# Patient Record
(demographics unavailable — no encounter records)

---

## 2024-10-28 NOTE — DISCUSSION/SUMMARY
[de-identified] : Today I had a lengthy discussion with the patient regarding their right ankle, s/p trimalleolar ORIF. I have addressed all the patient's concerns surrounding the pathology of their condition. I have reviewed the patient's XR imaging with them in great detail. Hardware in good position.  Ankle in anatomical alignment.  Fractures are healed. I have reviewed the patient's MRI results with them in great detail. Status post bimalleolar fracture fixation. 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 I did advise the patient that she may need hardware removal in the future. For now, I do want her to continue with conservative treatments.     Plan:  1. I recommend that the patient utilize ice, NSAIDS/Tylenol PRN, and heat. They can also elevate their RLE above the level of the heart. 2. Continue to follow up with her wound care team.   f/u in 3 months.    The patient understood and verbally agreed to the treatment plan. All of their questions were answered, and they were satisfied with the visit. The patient should call the office if they have any questions or experience worsening symptoms.

## 2024-10-28 NOTE — HISTORY OF PRESENT ILLNESS
[FreeTextEntry1] : 10/28/2024: ERIBERTO JENSEN is a 65 year old female presenting to the office for a follow up evaluation status post right ankle trimalleolar ORIF, DOS: 06/25/2024. Her symptoms have improved since last visit. She is able to walk in sneakers without difficulty. She received an MRI, which was ordered by her wound care team. She has also been using alginate from wound care. She has been avoiding ankle submersion. The patient presents to the office in sneakers and ambulating without assistance.  09/26/2024: The patient is a 63 yo female who is status post right ankle ORIF, trimalleolar with wound complications.  Patient is currently in wound care being treated by Dr. Chan.  The wound is looking much better and she presents with a Band-Aid over the wound.  She does state that the wound does drain some yellow liquid every now and then but the wound has been improved since being seen by wound care.  The patient continues with physical therapy working on strength.  She does continue to have intermittent pains of the ankle but overall she is improving.  She is walking without assistance with a slight limp.  No other complaints.

## 2024-10-28 NOTE — REASON FOR VISIT
[Follow-Up Visit] : a follow-up visit for [FreeTextEntry2] : Status post right ankle ORIF, trimalleolar fracture 6/25/2024

## 2024-10-28 NOTE — ADDENDUM
[FreeTextEntry1] : I, Dennis Maldonado, acted solely as a scribe for Dr. Raudel Clancy on this date 10/28/2024.   All medical record entries made by the Scribe were at my, Dr. Raudel Clancy, direction and personally dictated by me on 10/28/2024. I have reviewed the chart and agree that the record accurately reflects my personal performance of the history, physical exam, assessment and plan. I have also personally directed, reviewed, and agreed with the chart.

## 2024-10-28 NOTE — PHYSICAL EXAM
[de-identified] : Right ankle Physical Examination:  General: Alert and oriented x3.  In no acute distress.  Pleasant in nature with a normal affect.  No apparent respiratory distress.  Erythema, Warmth, Rubor: Negative Swelling: Mild swelling present  On the lateral incision, the small wound with scab formation is healing well.  There is no drainage coming from the wound today.  No signs of infection. Granulating in nicely.   ROM: 1. Dorsiflexion: 0 degrees 2. Plantarflexion: 40 degrees 3. Inversion: 30 degrees 4. Eversion: 20 degrees 5. Subtalar: 10 degrees  Tenderness to Palpation:  1. Lateral Malleolus: Negative 2. Medial Malleolus: Negative 3. Proximal Fibular Pain: Negative 4. Heel Pain: Negative 5. Cuboid: Negative 6. Navicular: Negative 7. Tibiotalar Joint: Negative 8. Subtalar Joint: Negative 9. Posterior Recess: Negative  Tendon Pain: 1. Achilles: Negative 2. Peroneals: Negative 3. Posterior Tibialis: Negative 4. Tibialis Anterior: Negative  Ligament Pain: 1. ATFL: Negative 2. CFL: Negative  3. PTFL: Negative 4. Deltoid Ligaments: Negative 5. Lis Franc Ligament: Negative  Stability:  1. Anterior Drawer: Negative 2. Posterior Drawer: Negative  Strength: 5/5 TA/GS/EHL  Pulses: 2+ DP/PT Pulses  Neuro: Intact motor and sensory  Additional Test: 1. Calcaneal Squeeze Test: Negative 2. Syndesmosis Squeeze Test: Negative [de-identified] : 3 views x-rays right ankle reviewed, 10/28/2024: Hardware in good position.  Ankle in anatomical alignment.  Fractures are healed.    Office Location: [address] [place] [address], [place] [address] Office Phone: [phone] Office Fax: [phone] Patient Name: Barbara Swain Patient Phone Number: [phone] Patient ID: [id number] : 10/11/1959 Date of Exam: 10/25/2024 R. Phys. Name: Gabriel Chan R. Phys. Address: [address] [place] [address] R. Phys. Phone: [phone] MRI-RIGHT ANKLE NON CONTRAST  Clinical indication: M25.571 Right ankle pain  Additional patient history: Right ankle pain, hx of surgery in  for fx.  Technique: MRI of the right ankle was performed utilizing standard multiplanar, multisequence image acquisition.  Comparison study: None available.  Findings:  The patient is status post bimalleolar fracture fixation.  There is no acute fracture or dislocation. There is no destructive appearing osseous lesion.  There is an approximate 0.8 x 0.8 cm focus of cartilage wear along the medial talar dome. There is less pronounced cartilage wear at the second and third tarsometatarsal joints.  A small tibiotalar joint effusion is present.  The sinus tarsi fat is preserved.  There is mild plantar fasciitis involving the central and lateral cords.  There is tendinopathy of the extensor digitorum longus tendon. The remaining long flexor and extensor tendons are normal and the tarsal tunnel contents are intact. The visualized peroneal tendons are normal. The Achilles and plantaris tendons are normal.  The distal syndesmotic ligaments are not well demonstrated. The remaining ankle ligaments appear to be intact.  IMPRESSION:   * Status post bimalleolar fracture fixation. 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 * Mild plantar fasciitis. 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 * Poor visualization of the distal syndesmotic ligaments.  Signed by: Stew Rodriguez MD Signed Date: 10/26/2024 10:40 PM EDT    SIGNED BY: Stew Rodriguez M.D., Ext. [id number] 10/26/2024 10:40 PM

## 2024-12-12 NOTE — PHYSICAL EXAM
[de-identified] : Right ankle Physical Examination:  General: Alert and oriented x3.  In no acute distress.  Pleasant in nature with a normal affect.  No apparent respiratory distress.  Erythema, Warmth, Rubor: Negative Swelling: Mild swelling present  On the lateral incision, the small wound with scab formation is healing well.  There is no drainage coming from the wound today.  No signs of infection. Granulating in nicely.   ROM: 1. Dorsiflexion: 0 degrees 2. Plantarflexion: 40 degrees 3. Inversion: 30 degrees 4. Eversion: 20 degrees 5. Subtalar: 10 degrees  Tenderness to Palpation:  1. Lateral Malleolus: Negative 2. Medial Malleolus: Negative 3. Proximal Fibular Pain: Negative 4. Heel Pain: Negative 5. Cuboid: Negative 6. Navicular: Negative 7. Tibiotalar Joint: Negative 8. Subtalar Joint: Negative 9. Posterior Recess: Negative  Tendon Pain: 1. Achilles: Negative 2. Peroneals: Negative 3. Posterior Tibialis: Negative 4. Tibialis Anterior: Negative  Ligament Pain: 1. ATFL: Negative 2. CFL: Negative  3. PTFL: Negative 4. Deltoid Ligaments: Negative 5. Lis Franc Ligament: Negative  Stability:  1. Anterior Drawer: Negative 2. Posterior Drawer: Negative  Strength: 5/5 TA/GS/EHL  Pulses: 2+ DP/PT Pulses  Neuro: Intact motor and sensory  Additional Test: 1. Calcaneal Squeeze Test: Negative 2. Syndesmosis Squeeze Test: Negative [de-identified] : 3 views x-rays right ankle reviewed, 2024: Hardware in good position.  Ankle in anatomical alignment.    Office Location: [address] [place] [address], [place], [facility] Office Phone: [phone] Office Fax: [phone] Patient Name: Barbara Swain Patient Phone Number: [phone] Patient ID: [id number] : 10/11/1959 Date of Exam: 10/25/2024 R. Phys. Name: Gabriel Chan R. Phys. Address: [address] [place] [address] R. Phys. Phone: [phone] MRI-RIGHT ANKLE NON CONTRAST  Clinical indication: M25.571 Right ankle pain  Additional patient history: Right ankle pain, hx of surgery in  for fx.  Technique: MRI of the right ankle was performed utilizing standard multiplanar, multisequence image acquisition.  Comparison study: None available.  Findings:  The patient is status post bimalleolar fracture fixation.  There is no acute fracture or dislocation. There is no destructive appearing osseous lesion.  There is an approximate 0.8 x 0.8 cm focus of cartilage wear along the medial talar dome. There is less pronounced cartilage wear at the second and third tarsometatarsal joints.  A small tibiotalar joint effusion is present.  The sinus tarsi fat is preserved.  There is mild plantar fasciitis involving the central and lateral cords.  There is tendinopathy of the extensor digitorum longus tendon. The remaining long flexor and extensor tendons are normal and the tarsal tunnel contents are intact. The visualized peroneal tendons are normal. The Achilles and plantaris tendons are normal.  The distal syndesmotic ligaments are not well demonstrated. The remaining ankle ligaments appear to be intact.  IMPRESSION:   * Status post bimalleolar fracture fixation. 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 * Mild plantar fasciitis. 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 * Poor visualization of the distal syndesmotic ligaments.  Signed by: Stew Rodriguez MD Signed Date: 10/26/2024 10:40 PM EDT    SIGNED BY: Stew Rodriguez M.D., Ext. [id number] 10/26/2024 10:40 PM

## 2024-12-12 NOTE — HISTORY OF PRESENT ILLNESS
[FreeTextEntry1] :  12/12/2024: ERIBERTO JENSEN is a 65 year old female presenting to the office for a follow up evaluation of  status post right ankle trimalleolar ORIF, DOS: 06/25/2024. She notes that her symptoms have not improved since her last visit. She feels a burning sensation on the inside of her ankle after she walks for prolonged periods of time.  The patient presents to the office in sneakers and ambulating without assistance.  10/28/2024: ERIBERTO JENSEN is a 65 year old female presenting to the office for a follow up evaluation status post right ankle trimalleolar ORIF, DOS: 06/25/2024. Her symptoms have improved since last visit. She is able to walk in sneakers without difficulty. She received an MRI, which was ordered by her wound care team. She has also been using alginate from wound care. She has been avoiding ankle submersion. The patient presents to the office in sneakers and ambulating without assistance.  09/26/2024: The patient is a 65 yo female who is status post right ankle ORIF, trimalleolar with wound complications.  Patient is currently in wound care being treated by Dr. Chan.  The wound is looking much better and she presents with a Band-Aid over the wound.  She does state that the wound does drain some yellow liquid every now and then but the wound has been improved since being seen by wound care.  The patient continues with physical therapy working on strength.  She does continue to have intermittent pains of the ankle but overall she is improving.  She is walking without assistance with a slight limp.  No other complaints.

## 2024-12-12 NOTE — ADDENDUM
[FreeTextEntry1] : I, Raudel Noonan, acted solely as a scribe for Dr. Raudel Clancy on this date 12/12/2024  .   All medical record entries made by the Scribe were at my, Dr. Raudel Clancy, direction and personally dictated by me on 12/12/2024 . I have reviewed the chart and agree that the record accurately reflects my personal performance of the history, physical exam, assessment and plan. I have also personally directed, reviewed, and agreed with the chart.

## 2024-12-12 NOTE — DISCUSSION/SUMMARY
[de-identified] : Today I had a lengthy discussion with the patient regarding their right ankle, s/p trimalleolar ORIF. I have addressed all the patient's concerns surrounding the pathology of their condition. I have reviewed the patient's XR imaging with them in great detail. Hardware in good position.  Ankle in anatomical alignment. I informed the patient that she may need a hardware removal surgery to alleviate her symptoms.     Plan:  1. I recommend that the patient utilize ice, NSAIDS/Tylenol PRN, and heat. They can also elevate their RLE above the level of the heart. 2. A lengthy discussion was had about the hardware removal surgery. All risks, benefits and alternatives to the recommended surgical procedure were discussed which include but are not limited to bleeding, infection, nerve damage, vascular damage, failure of the wound to heal, the need for further surgery, loss of limb, DVT, PE, loss of life as well as the risks associated with general anesthesia. The patient verbalized understanding and provided informed consent to move forward with surgery.  Patient would like to have surgery in mid-January.    The patient understood and verbally agreed to the treatment plan. All of their questions were answered, and they were satisfied with the visit. The patient should call the office if they have any questions or experience worsening symptoms.

## 2025-01-30 NOTE — HISTORY OF PRESENT ILLNESS
[___ Weeks Post Op] : [unfilled] weeks post op [de-identified] : Status post right ankle hardware removal 1/16/2025 [de-identified] :  01/30/2025: ERIBERTO JENSEN is a 65 year old female presenting to the office for a follow up evaluation of her Status post right ankle hardware removal 1/16/2025. She is on DVT prophylaxis. The patient presents to the office NWB wearing a CAM boot. Pain is controlled. [de-identified] : General: Alert and oriented x3. In no acute distress. Pleasant in nature with a normal affect. No apparent respiratory distress.  R Ankle Exam  Skin: Clean, dry, intact Inspection: No obvious malalignment, no swelling, no effusion; no lymphadenopathy Pulses: 2+ DP/PT pulses ROM: X Ankle 10 degrees of dorsiflexion, 40 degrees of plantarflexion, 10 degrees of subtalar motion, 20 degrees of inversion, 20 degrees of eversion Tenderness: No tenderness over the lateral malleolus, no CFL/ATFL/PTFL pain. No medial malleolus pain, no deltoid ligament pain. No proximal fibular pain. No heel pain. Stability: Negative anterior/posterior drawer. Strength: 5/5 TA/GS/EHL Neuro: In tact to light touch throughout Additional tests: Negative Mortons test, Negative syndesmosis squeeze test. Negative Cedeno's. Negative Tinel's Sign [de-identified] : No new images.  [de-identified] :  Today I had a lengthy discussion with the patient regarding their status post right ankle hardware removal 1/16/2025 pain.I have addressed all the patient's concerns surrounding the pathology of their condition.  I recommend that the patient utilize ice, NSAIDS PRN, and heat. They can also elevate their RLE above the level of the heart. Continue wearing the cam boot.  Wound care was explained the patient. She is not to get the wound wet for the next 10 days.  The patient understood and verbally agreed to the treatment plan. All of their questions were answered and they were satisfied with the visit. The patient should call the office if they have any questions or experience worsening symptoms.  FU in 2 weeks

## 2025-01-30 NOTE — ADDENDUM
[FreeTextEntry1] : I, Raudel Noonan, acted solely as a scribe for Dr. Raudel Clancy on this date 01/30/2025  .   All medical record entries made by the Scribe were at my, Dr. Raudel Clancy, direction and personally dictated by me on 01/30/2025 . I have reviewed the chart and agree that the record accurately reflects my personal performance of the history, physical exam, assessment and plan. I have also personally directed, reviewed, and agreed with the chart.

## 2025-03-21 NOTE — HISTORY OF PRESENT ILLNESS
[___ Months Post Op] : [unfilled] months post op [3] : the patient reports pain that is 3/10 in severity [Clean/Dry/Intact] : clean, dry and intact [Healed] : healed [Swelling] : swollen [Neuro Intact] : an unremarkable neurological exam [Vascular Intact] : ~T peripheral vascular exam normal [Negative Billy's] : maneuvers demonstrated a negative Billy's sign [Slow Progress] : is progressing slowly [Fair Pain Control] : has fair pain control [No Sign of Infection] : is showing no signs of infection [Sutures Removed] : sutures were removed [Chills] : no chills [Fever] : no fever [Nausea] : no nausea [Vomiting] : no vomiting [Erythema] : not erythematous [Discharge] : absent of discharge [Dehiscence] : not dehisced [de-identified] : Status post right ankle hardware removal 1/16/2025 [de-identified] : ERIBERTO JENSEN is a 65 year old female presenting to the office with her  for a follow up evaluation of her Status post right ankle hardware removal 1/16/2025. She now endorses a shooting pain in the medial aspect of her ankle that radiates up her calf. Pain occurs even at rest. She also notes tenderness in the lateral aspect of her ankle. At night, she notes swelling in the medial aspect of her ankle. She constantly limps with her foot tilted outwards. The patient presents to the office in sneakers and ambulating without assistance. [de-identified] : General: Alert and oriented x3. In no acute distress. Pleasant in nature with a normal affect. No apparent respiratory distress. The wound is intact. no evidence of any diastases or dehiscence. No surrounding erythema noted. No fluctuance. The area is warm and well perfused. The patient is able to wiggle their toes. Neurovascularly intact. Swelling noted. [de-identified] : 3V of the right ankle were ordered, obtained and reviewed by me today, 03/21/2025, and revealed: hardware removed. Fracture has healed well. [de-identified] : Counseled the patient that given the intra-articular nature of her fracture, she is pre-disposed to developing post-traumatic arthritis. I suspect that her pain may be due to arthritis. [de-identified] : Plan: 1. RICE therapy as needed for swelling control. 2. NSAIDs/Tylenol as needed for pain. 3. Activity modification was discussed in great detail. Weight bear as tolerated. 4. I recommend the patient undergo a course of physical therapy for the right ankle 2-3 times a week for a total of 8-12 weeks. A prescription was given for the physical therapy today. 5. Paperwork for 3 months disability provided in the office today.  f/u in 6 weeks.  The patient understood and verbally agreed to the treatment plan. All of their questions were answered, and they were satisfied with the visit. The patient should call the office if they have any questions or experience worsening symptoms.

## 2025-03-21 NOTE — ADDENDUM
[FreeTextEntry1] : I, Dennis Maldonado, acted solely as a scribe for Dr. Raudel Clancy on this date 03/21/2025.   All medical record entries made by the Scribe were at my, Dr. Raudel Clancy, direction and personally dictated by me on 03/21/2025. I have reviewed the chart and agree that the record accurately reflects my personal performance of the history, physical exam, assessment and plan. I have also personally directed, reviewed, and agreed with the chart.

## 2025-03-21 NOTE — HISTORY OF PRESENT ILLNESS
[___ Months Post Op] : [unfilled] months post op [3] : the patient reports pain that is 3/10 in severity [Clean/Dry/Intact] : clean, dry and intact [Healed] : healed [Swelling] : swollen [Neuro Intact] : an unremarkable neurological exam [Vascular Intact] : ~T peripheral vascular exam normal [Negative Billy's] : maneuvers demonstrated a negative Billy's sign [Slow Progress] : is progressing slowly [Fair Pain Control] : has fair pain control [No Sign of Infection] : is showing no signs of infection [Sutures Removed] : sutures were removed [Chills] : no chills [Fever] : no fever [Nausea] : no nausea [Vomiting] : no vomiting [Erythema] : not erythematous [Discharge] : absent of discharge [Dehiscence] : not dehisced [de-identified] : Status post right ankle hardware removal 1/16/2025 [de-identified] : ERIBERTO JENSEN is a 65 year old female presenting to the office with her  for a follow up evaluation of her Status post right ankle hardware removal 1/16/2025. She now endorses a shooting pain in the medial aspect of her ankle that radiates up her calf. Pain occurs even at rest. She also notes tenderness in the lateral aspect of her ankle. At night, she notes swelling in the medial aspect of her ankle. She constantly limps with her foot tilted outwards. The patient presents to the office in sneakers and ambulating without assistance. [de-identified] : General: Alert and oriented x3. In no acute distress. Pleasant in nature with a normal affect. No apparent respiratory distress. The wound is intact. no evidence of any diastases or dehiscence. No surrounding erythema noted. No fluctuance. The area is warm and well perfused. The patient is able to wiggle their toes. Neurovascularly intact. Swelling noted. [de-identified] : 3V of the right ankle were ordered, obtained and reviewed by me today, 03/21/2025, and revealed: hardware removed. Fracture has healed well. [de-identified] : Counseled the patient that given the intra-articular nature of her fracture, she is pre-disposed to developing post-traumatic arthritis. I suspect that her pain may be due to arthritis. [de-identified] : Plan: 1. RICE therapy as needed for swelling control. 2. NSAIDs/Tylenol as needed for pain. 3. Activity modification was discussed in great detail. Weight bear as tolerated. 4. I recommend the patient undergo a course of physical therapy for the right ankle 2-3 times a week for a total of 8-12 weeks. A prescription was given for the physical therapy today. 5. Paperwork for 3 months disability provided in the office today.  f/u in 6 weeks.  The patient understood and verbally agreed to the treatment plan. All of their questions were answered, and they were satisfied with the visit. The patient should call the office if they have any questions or experience worsening symptoms.

## 2025-05-02 NOTE — ADDENDUM
[FreeTextEntry1] : I, Dennis Maldonado, acted solely as a scribe for Dr. Raudel Clancy on this date 05/02/2025.   All medical record entries made by the Scribe were at my, Dr. Raudel Clancy, direction and personally dictated by me on 05/02/2025. I have reviewed the chart and agree that the record accurately reflects my personal performance of the history, physical exam, assessment and plan. I have also personally directed, reviewed, and agreed with the chart.

## 2025-05-02 NOTE — HISTORY OF PRESENT ILLNESS
[FreeTextEntry1] : 05/02/2025: ERIBERTO JENSEN is a 65 year old female presenting to the office with her  for a follow up evaluation s/p right ankle hardware removal, DOS: 1/16/2025. She is doing well. She still notes residual pain, swelling, TTP, and stiffness. Pain is localized to the medial aspect of her ankle and she now details heel pain.. Her foot still tilts outwards. She has difficulty wearing any footwear other than sneakers without difficulty. She has been attending physical therapy with slow progress. However, she reports overall improvement in her pre-operative pain. The patient presents to the office in sneakers and ambulating without assistance.

## 2025-05-02 NOTE — REASON FOR VISIT
[Follow-Up Visit] : a follow-up visit for [FreeTextEntry2] : s/p right ankle hardware removal, DOS: 1/16/2025

## 2025-05-02 NOTE — PHYSICAL EXAM
[de-identified] : Right ankle Physical Examination:   General: Alert and oriented x3.  In no acute distress.  Pleasant in nature with a normal affect.  No apparent respiratory distress. Erythema, Warmth, Rubor: Negative Swelling: Negative  The wound is intact. no evidence of any diastases or dehiscence. No surrounding erythema noted. No fluctuance. The area is warm and well perfused. The patient is able to wiggle their toes. Neurovascularly intact. S   ROM: 1. Dorsiflexion: 10 degrees 2. Plantarflexion: 40 degrees 3. 10 degrees of subtalar motion 4. Inversion: 20 degrees 5. Eversion: 20 degrees   Tenderness to Palpation: 1. Lateral Malleolus: Negative 2. Medial Malleolus: + 3. Proximal Fibular Pain: Negative 4. Heel Pain: Negative 5. Cuboid: Negative 6. Navicular: Negative 7. Tibiotalar Joint: Negative 8. Subtalar Joint: Negative 9. Posterior Recess: Negative   Tendon Pain: 1. Achilles: Negative 2. Peroneals: Negative 3. Posterior Tibialis: Negative 4. Tibialis Anterior: Negative   Ligament Pain: 1. ATFL: Negative 2. CFL: Negative 3. PTFL: Negative 4. Deltoid Ligaments: Negative 5. Lis Franc Ligament: Negative   Stability: 1. Anterior Drawer: Negative 2. Posterior Drawer: Negative   Strength: 5/5 TA/GS/EHL   Pulses: 2+ DP/PT Pulses   Neuro: Intact motor and sensory   Additional Test: 1. Calcaneal Squeeze Test: Negative 2. Syndesmosis Squeeze Test: Negative 3. Cedeno Test: Negative  [de-identified] : 3V of the right ankle were ordered, obtained and reviewed by me today, 05/02/2025, and revealed: joint spaces well maintained. Hardware removed. Fracture has healed well.

## 2025-05-02 NOTE — DISCUSSION/SUMMARY
[de-identified] : Today I had a lengthy discussion with the patient regarding their right ankle removal of hardware. I have addressed all the patient's concerns surrounding the pathology of their condition. I have reviewed the patient's XR imaging with them in great detail. Counseled the patient that given the intra-articular nature of her fracture, she is pre-disposed to developing post-traumatic arthritis in the future.    Plan:  1. I recommend that the patient utilize ice, NSAIDS/Tylenol PRN, and heat. 2. A discussion was had about shoe-wear modifications. I advised the patient to utilize a wide toed cross training sneaker that better accommodates the feet. I recommended New Balance, [employer], [employer], or [employer] to the patient. 3. The patient can resume normal baseline activity as tolerated. 4. The patent can continue with physical therapy.   The patient understood and verbally agreed to the treatment plan. All of their questions were answered, and they were satisfied with the visit. The patient should call the office if they have any questions or experience worsening symptoms.  f/u in 3 months

## 2025-07-11 NOTE — REVIEW OF SYSTEMS
[Recent Weight Gain (___ Lbs)] : recent [unfilled] ~Ulb weight gain [Recent Weight Loss (___ Lbs)] : recent [unfilled] ~Ulb weight loss [Negative] : Heme/Lymph [FreeTextEntry9] : ankle surgery

## 2025-07-11 NOTE — HISTORY OF PRESENT ILLNESS
[Patient reported mammogram was normal] : Patient reported mammogram was normal [Patient reported PAP Smear was normal] : Patient reported PAP Smear was normal [Patient reported bone density results were normal] : Patient reported bone density results were normal [Patient reported colonoscopy was normal] : Patient reported colonoscopy was normal [LMP unknown] : LMP unknown [Monogamous (Male Partner)] : is monogamous with a male partner [Y] : Positive pregnancy history [FreeTextEntry1] : 65 yr old P2 here for annual exam. Patient broke her ankle and has had 2 surgeries. Weight gain is a big problem for her and she is requesting referral to weight loss specialist. [Mammogramdate] : 2023 [PapSmeardate] : 2023 [BoneDensityDate] : 2021 [ColonoscopyDate] : 2023 [PGHxTotal] : 3 [[facility] [PGHxAbortions] : 1 [[facility]] : 2